# Patient Record
(demographics unavailable — no encounter records)

---

## 2024-11-03 NOTE — HISTORY OF PRESENT ILLNESS
[FreeTextEntry1] : 50 y/o P1 presenting for pre-op visit in preparation for TLH given AUB despite endometrial ablation and not yet in menopause. See 9/30 note for details.

## 2024-11-03 NOTE — DISCUSSION/SUMMARY
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 Also small simple ovarian cysts on sono. - Reviewed TLH again as above including risks - Pt has cardiology clearance in context of cardiac stents - Plan to keep ovaries given FSH not yet in menopause range, or remove one at most if cyst present - Endometrium benign on hysteroscopy last November and endometrium thin today, Pap NIL, HPV neg 11/2022, no need to repeat these prior to surgery - Bowel preparation instructions and script given (mostly for decompression in event of poor Trendelenburg tolerance) - c/w plan for TLH, b/l salpingectomy, cystoscopy, possible USO on 11/11 - c/w norethindrone until surgery - Return post-op  Michael Perez MD.

## 2024-11-03 NOTE — REASON FOR VISIT
[TextEntry] : This visit was provided via telehealth using real-time 2-way audio visual technology. The patient, AARON KRUEGER, was located at home at the time of the visit, at the following address: [address] [place], [place] [address] [address] The provider, Dr. Michael Perez, was located at the medical office located at [address] [place] [address] [address] [address] at the time of the visit. The patient, AARON KRUEGER and Provider participated in the telehealth encounter. Verbal consent given on 11/01/2024 by the patient, self.

## 2024-11-04 NOTE — ASSESSMENT
[FreeTextEntry1] : 51 year old female with history of CAD, HTN, HLD here to establish. Patient is getting hysterectomy on 11/11  HCM -reviewed previous labs -lipids measured -Urinalysis, hx of recent uti -No flu shot due to upcoming surgery -Recent ekg reviewed shows flattening of T wave.   Adenomysosis planned hysterectomy on 11/11 -patient cleared for surgery pending cardiology clearance -labs reviewed -patient had recent echo, results pending.   Anxiety advised by cardiology to start prozac 20mg, will continue for time being

## 2024-11-04 NOTE — HEALTH RISK ASSESSMENT
[Monthly or less (1 pt)] : Monthly or less (1 point) [1 or 2 (0 pts)] : 1 or 2 (0 points) [Never (0 pts)] : Never (0 points) [No falls in past year] : Patient reported no falls in the past year [0] : 2) Feeling down, depressed, or hopeless: Not at all (0) [Former] : Former [20 or more] : 20 or more [< 15 Years] : < 15 Years [Patient reported colonoscopy was normal] : Patient reported colonoscopy was normal [None] : None [With Significant Other] : lives with significant other [] :  [# Of Children ___] : has [unfilled] children [Feels Safe at Home] : Feels safe at home [Fully functional (bathing, dressing, toileting, transferring, walking, feeding)] : Fully functional (bathing, dressing, toileting, transferring, walking, feeding) [Fully functional (using the telephone, shopping, preparing meals, housekeeping, doing laundry, using] : Fully functional and needs no help or supervision to perform IADLs (using the telephone, shopping, preparing meals, housekeeping, doing laundry, using transportation, managing medications and managing finances) [Audit-CScore] : 1 [[id number]] : 0 [de-identified] : quit in summer of 2015 [Sexually Active] : not sexually active [High Risk Behavior] : no high risk behavior [Reports changes in hearing] : Reports no changes in hearing [Reports changes in vision] : Reports no changes in vision [Reports normal functional visual acuity (ie: able to read med bottle)] : Reports poor functional visual acuity.  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

## 2024-11-04 NOTE — HISTORY OF PRESENT ILLNESS
[FreeTextEntry1] : Here for CPE. New patient Clearance for Hysterectomy 11/11 [de-identified] : 51 year old female with history of CAD s/p stent in 2016, HTN, HLD, adenomyosis presents for physical. Patient recent labs for pre-op clearance reviewed.

## 2024-11-04 NOTE — HEALTH RISK ASSESSMENT
[Monthly or less (1 pt)] : Monthly or less (1 point) [1 or 2 (0 pts)] : 1 or 2 (0 points) [Never (0 pts)] : Never (0 points) [No falls in past year] : Patient reported no falls in the past year [0] : 2) Feeling down, depressed, or hopeless: Not at all (0) [Former] : Former [20 or more] : 20 or more [< 15 Years] : < 15 Years [Patient reported colonoscopy was normal] : Patient reported colonoscopy was normal [None] : None [With Significant Other] : lives with significant other [] :  [# Of Children ___] : has [unfilled] children [Feels Safe at Home] : Feels safe at home [Fully functional (bathing, dressing, toileting, transferring, walking, feeding)] : Fully functional (bathing, dressing, toileting, transferring, walking, feeding) [Fully functional (using the telephone, shopping, preparing meals, housekeeping, doing laundry, using] : Fully functional and needs no help or supervision to perform IADLs (using the telephone, shopping, preparing meals, housekeeping, doing laundry, using transportation, managing medications and managing finances) [Audit-CScore] : 1 [EJP1Moggc] : 0 [de-identified] : quit in summer of 2015 [Sexually Active] : not sexually active [High Risk Behavior] : no high risk behavior [Reports changes in hearing] : Reports no changes in hearing [Reports changes in vision] : Reports no changes in vision [Reports normal functional visual acuity (ie: able to read med bottle)] : Reports poor functional visual acuity.  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

## 2024-11-04 NOTE — HEALTH RISK ASSESSMENT
[Good] : ~his/her~ current health as good [Fair] :  ~his/her~ mood as fair [Yes] : Yes [2 - 3 times a week (3 pts)] : 2 - 3  times a week (3 points) [1 or 2 (0 pts)] : 1 or 2 (0 points) [Never (0 pts)] : Never (0 points) [No falls in past year] : Patient reported no falls in the past year [0] : 2) Feeling down, depressed, or hopeless: Not at all (0) [Former] : Former [20 or more] : 20 or more [Behavior] : difficulty with behavior [] :  [# Of Children ___] : has [unfilled] children [Feels Safe at Home] : Feels safe at home [Smoke Detector] : smoke detector [Carbon Monoxide Detector] : carbon monoxide detector

## 2024-11-04 NOTE — HISTORY OF PRESENT ILLNESS
[FreeTextEntry1] : Here for CPE. New patient Clearance for Hysterectomy 11/11 [de-identified] : 51 year old female with history of CAD s/p stent in 2016, HTN, HLD, adenomyosis presents for physical. Patient recent labs for pre-op clearance reviewed.

## 2024-11-04 NOTE — ADDENDUM
[FreeTextEntry1] : Adenomysosis planned hysterectomy on 11/11 -patient cleared for surgery pending cardiology clearance -labs reviewed -patient had recent echo, results pending.

## 2024-11-25 NOTE — PLAN
[FreeTextEntry1] : s/p TLH, b/l salpingectomy, cysto on 11/11 recovering well. - Discussed symptoms likely normal for recovery, can continue to monitor as they should continue to improve - Reviewed benign pathology - Reviewed no heavy lifting or submersion into water for 6 weeks post-op, no sex for 12 weeks post-op. Can return to all other normal activity - Return in 5-6w for vaginal cuff check  Michael Perez MD

## 2024-11-25 NOTE — HISTORY OF PRESENT ILLNESS
[Pain is well-controlled] : pain is well-controlled [Fever] : no fever [Chills] : no chills [Nausea] : no nausea [Vomiting] : no vomiting [Clean/Dry/Intact] : clean, dry and intact [Erythema] : not erythematous [Pathology reviewed] : pathology reviewed [de-identified] : 11/11/2024 [de-identified] : TLCHIN, EDITH, DELFINOO [de-identified] : s/p TLH, b/l salpingectomy, cysto on 11/11 presenting for post-op visit. Reports some bloating still after eating, but no nausea nor vomiting. Some fatigue still as well, otherwise pain well controlled, no other concerns.

## 2025-01-23 NOTE — PLAN
[FreeTextEntry1] : s/p TLH, b/l salpingectomy, cysto on 11/11 recovering well. Vaginal cuff intact on visualization and palpation - Can return to all normal activity currently, and can return to intercourse next week - Return for annual visit or as needed  Michael Perez MD

## 2025-01-23 NOTE — HISTORY OF PRESENT ILLNESS
[Pain is well-controlled] : pain is well-controlled [Fever] : no fever [Chills] : no chills [Nausea] : no nausea [Vomiting] : no vomiting [Clean/Dry/Intact] : clean, dry and intact [Erythema] : not erythematous [None] : no vaginal bleeding [Normal] : normal [de-identified] : 11/11/2024 [de-identified] : TLCHIN, EDITH, DELFINOO [de-identified] : s/p TLH, b/l salpingectomy, cysto on 11/11 presenting for post-op visit. No complaints today.

## 2025-02-12 NOTE — HEALTH RISK ASSESSMENT
[No] : No [No falls in past year] : Patient reported no falls in the past year [Little interest or pleasure doing things] : 1) Little interest or pleasure doing things [Feeling down, depressed, or hopeless] : 2) Feeling down, depressed, or hopeless [0] : 2) Feeling down, depressed, or hopeless: Not at all (0) [PHQ-2 Negative - No further assessment needed] : PHQ-2 Negative - No further assessment needed [[id number]] : 0

## 2025-02-12 NOTE — HISTORY OF PRESENT ILLNESS
[FreeTextEntry1] : follow up htn predm hld  [de-identified] : htn predm  hld has been well post surgery

## 2025-03-03 NOTE — PLAN
[TextEntry] : Continue AutoPAP.  Changed pressure to 9-15 cmH2O. Advised she try w/o ramp; showed her how to change it.  Weight loss. Cardiology f/u.  Let me know if any issues.

## 2025-03-03 NOTE — REVIEW OF SYSTEMS
[EDS: ESS=____] : daytime somnolence: ESS=[unfilled] [Snoring] : snoring [Obesity] : obesity [Heartburn] : heartburn [Hypersomnolence] : sleeping much more than usual [Negative] : Psychiatric [Difficulty Initiating Sleep] : no difficulty falling asleep [Lower Extremity Discomfort] : no lower extremity discomfort [Unusual Sleep Behavior] : no unusual sleep behavior

## 2025-03-03 NOTE — PHYSICAL EXAM
[General Appearance - In No Acute Distress] : no acute distress [Normal Conjunctiva] : the conjunctiva exhibited no abnormalities [Low Lying Soft Palate] : low lying soft palate [Elongated Uvula] : elongated uvula [III] : III [Heart Rate And Rhythm] : heart rate was normal and rhythm regular [Heart Sounds] : normal S1 and S2 [Respiration, Rhythm And Depth] : normal respiratory rhythm and effort [Exaggerated Use Of Accessory Muscles For Inspiration] : no accessory muscle use [Auscultation Breath Sounds / Voice Sounds] : lungs were clear to auscultation bilaterally [Abnormal Walk] : normal gait [Musculoskeletal - Swelling] : no joint swelling seen [Nail Clubbing] : no clubbing of the fingernails [Cyanosis, Localized] : no localized cyanosis [No Focal Deficits] : no focal deficits [Oriented To Time, Place, And Person] : oriented to person, place, and time [Impaired Insight] : insight and judgment were intact [Affect] : the affect was normal [Neck Appearance] : the appearance of the neck was normal [] : the neck was supple [Normal Oropharynx] : abnormal oropharynx

## 2025-03-03 NOTE — HISTORY OF PRESENT ILLNESS
[Obstructive Sleep Apnea] : obstructive sleep apnea [Date: ___] : Date of most recent diagnostic polysomnogram: [unfilled] [AHI: ___ per hour] : Apnea-hypopnea index:  [unfilled] per hour [CPAP: ___ cmH2O] : CPAP: [unfilled] cmH2O [% Days used > 4 hrs: ____] : Days used > 4 hrs: [unfilled] % [Therapy based AHI: ___ /hr] : Therapy based AHI: [unfilled] / hr [FreeTextEntry1] : Hx moderate XU.   Doing well on CPAP as APAP 5-20. Got a new machine 11/23/23. On Dreamwear UTN mask.  Compliance is excellent. Therapeutic AHI WNL.  Feels better since using it. No EDS. More energy during the day. Only issue is that when she first puts it on, does not feel like enough pressure.     [Snoring] : no snoring [Witnessed Apneas] : no witnessed sleep apnea [Unintentional Sleep While Inactive] : no unintentional sleep while inactive [Awakes Unrefreshed] : does not awake unrefreshed [Awakes with Headache] : no headache upon awakening [Awakening With Dry Mouth] : no dry mouth upon awakening [Recent  Weight Gain] : no recent weight gain [Daytime Somnolence] : no daytime somnolence

## 2025-03-03 NOTE — CONSULT LETTER
[Dear  ___] : Dear  [unfilled], [Courtesy Letter:] : I had the pleasure of seeing your patient, [unfilled], in my office today. [Consult Closing:] : Thank you very much for allowing me to participate in the care of this patient.  If you have any questions, please do not hesitate to contact me. [DrSoumya  ___] : Dr. AGRCIA

## 2025-03-25 NOTE — HISTORY OF PRESENT ILLNESS
[FreeTextEntry1] : 9/14/21: 48 y/o woman with severe right and moderate L carotid stenosis. She denies dizziness, lightheadedness, SOB, Amaurosis Fugax, memory or neurological deficits, but continues to experience headaches, unchanged. She exercises regularly, maintains a balanced diet and adequate hydration. Currently on ASA 81 mg, Atorvastatin 40 mg.   3/16/22: Pt is doing well since last visit. She denies dizziness, lightheadedness, SOB, Amaurosis Fugax, memory or neurological deficits, but continues to experience headaches, unchanged. She has been complaint with ASA 81 mg and atorvastatin 40 mg. She has been walking without any claudication.   3/15/23: Pt is doing well since last visit. She denies dizziness, lightheadedness, SOB, Amaurosis Fugax, memory or neurological deficits, but continues to experience headaches, unchanged. She has been complaint with ASA 81 mg and atorvastatin 40 mg. She has been walking without any claudication.   3/20/24: Pt is doing well since last visit. She denies dizziness, lightheadedness, SOB, Amaurosis Fugax, memory or neurological deficits, but continues to experience headaches, unchanged. She has been complaint with ASA 81 mg and atorvastatin 40 mg. She has been walking without any claudication.   3/19/25: Pt is doing well since last visit. She denies dizziness, lightheadedness, SOB, Amaurosis Fugax, memory or neurological deficits, but continues to experience headaches, unchanged. She has been complaint with ASA 81 mg and atorvastatin 40 mg. She has been walking without any claudication.

## 2025-03-25 NOTE — PHYSICAL EXAM
[Alert] : alert [Oriented to Person] : oriented to person [Oriented to Place] : oriented to place [Oriented to Time] : oriented to time [Calm] : calm [Right Carotid Bruit] : no bruit heard over the right carotid [Left Carotid Bruit] : no bruit heard over the left carotid [Ankle Swelling (On Exam)] : not present [] : not present [Varicose Veins Of Lower Extremities] : not present [de-identified] : WD, WN, NAD. Awake, alert, interactive. Ambulates without difficulty [de-identified] : no cyanosis or deformity. full ROM, MS 5/5\par   [de-identified] : SRIRAM.

## 2025-03-25 NOTE — ASSESSMENT
[FreeTextEntry1] : 50 y/o female with asymptomatic carotid stenosis possibly due to FMD. U/S demonstrates no significant B/L ICA stenosis, unchanged. We will continue to monitor closely by serial ultrasound as she remains asymptomatic.  Continue ASA and atorvastatin TX. Continue medical management for HTN. Maintain a balanced diet and adequate hydration.  Exercise on a daily basis, which improves HDL and reduces LDL. U/S Carotid Duplex completed in office today. I have evaluated results and discussed them fully with the patient. All questions and concerns have been discussed to patient satisfaction. RTC in 1 year for repeat carotid artery duplex  A total of 20 minutes was spent with patient and coordinating care

## 2025-03-25 NOTE — ASSESSMENT
[FreeTextEntry1] : 52 y/o female with asymptomatic carotid stenosis possibly due to FMD. U/S demonstrates no significant B/L ICA stenosis, unchanged. We will continue to monitor closely by serial ultrasound as she remains asymptomatic.  Continue ASA and atorvastatin TX. Continue medical management for HTN. Maintain a balanced diet and adequate hydration.  Exercise on a daily basis, which improves HDL and reduces LDL. U/S Carotid Duplex completed in office today. I have evaluated results and discussed them fully with the patient. All questions and concerns have been discussed to patient satisfaction. RTC in 1 year for repeat carotid artery duplex  A total of 20 minutes was spent with patient and coordinating care

## 2025-03-25 NOTE — PHYSICAL EXAM
[Alert] : alert [Oriented to Person] : oriented to person [Oriented to Place] : oriented to place [Oriented to Time] : oriented to time [Calm] : calm [Right Carotid Bruit] : no bruit heard over the right carotid [Left Carotid Bruit] : no bruit heard over the left carotid [Ankle Swelling (On Exam)] : not present [Varicose Veins Of Lower Extremities] : not present [] : not present [de-identified] : WD, WN, NAD. Awake, alert, interactive. Ambulates without difficulty [de-identified] : no cyanosis or deformity. full ROM, MS 5/5\par   [de-identified] : SRIRAM.

## 2025-03-25 NOTE — PROCEDURE
[FreeTextEntry1] : Studies:  3/16/22 BL carotid artery duplex:  right: 50-69% ICA stenosis. Vertebral artery antegrade.  left: 50-69% ICA stenosis. ICA tortuous.  Vertebral artery antegrade.   3/15/23 BL carotid artery duplex:  right: 50-69% ICA stenosis. Vertebral artery antegrade.  left: 50-69% ICA stenosis. ICA tortuous.  Vertebral artery antegrade.   3/20/24 BL carotid artery duplex:  right: <50% ICA stenosis. Vertebral artery antegrade.  left: <50% ICA stenosis. ICA tortuous.  Vertebral artery antegrade.   3/19/25 BL carotid artery duplex:  right: <50% ICA stenosis. Vertebral artery antegrade.  left: <50% ICA stenosis. ICA tortuous.  Vertebral artery antegrade.

## 2025-03-25 NOTE — HISTORY OF PRESENT ILLNESS
[FreeTextEntry1] : 9/14/21: 46 y/o woman with severe right and moderate L carotid stenosis. She denies dizziness, lightheadedness, SOB, Amaurosis Fugax, memory or neurological deficits, but continues to experience headaches, unchanged. She exercises regularly, maintains a balanced diet and adequate hydration. Currently on ASA 81 mg, Atorvastatin 40 mg.   3/16/22: Pt is doing well since last visit. She denies dizziness, lightheadedness, SOB, Amaurosis Fugax, memory or neurological deficits, but continues to experience headaches, unchanged. She has been complaint with ASA 81 mg and atorvastatin 40 mg. She has been walking without any claudication.   3/15/23: Pt is doing well since last visit. She denies dizziness, lightheadedness, SOB, Amaurosis Fugax, memory or neurological deficits, but continues to experience headaches, unchanged. She has been complaint with ASA 81 mg and atorvastatin 40 mg. She has been walking without any claudication.   3/20/24: Pt is doing well since last visit. She denies dizziness, lightheadedness, SOB, Amaurosis Fugax, memory or neurological deficits, but continues to experience headaches, unchanged. She has been complaint with ASA 81 mg and atorvastatin 40 mg. She has been walking without any claudication.   3/19/25: Pt is doing well since last visit. She denies dizziness, lightheadedness, SOB, Amaurosis Fugax, memory or neurological deficits, but continues to experience headaches, unchanged. She has been complaint with ASA 81 mg and atorvastatin 40 mg. She has been walking without any claudication.

## 2025-06-26 NOTE — HISTORY OF PRESENT ILLNESS
[postmenopausal] : postmenopausal [Y] : Positive pregnancy history [Approximately ___ (Month)] : LMP was approximately [unfilled] month(s) ago [Menarche Age: ____] : age at menarche was [unfilled] [No] : Patient does not have concerns regarding sex [Mammogramdate] : 09/28/24 [TextBox_19] : BR-2 [BreastSonogramDate] : 09/28/24 [TextBox_25] : BR-2 [PapSmeardate] : 01/12/24 [TextBox_31] : NEG [ColonoscopyDate] : 06/26/23 [HPVDate] : 01/12/24 [TextBox_78] : NEG [LMPDate] : 11/2023 [PGxTotal] : 1 [[facility] Center[facility]] : 1 [[facility]iving] : 1 [FreeTextEntry1] :  X1

## 2025-06-26 NOTE — DISCUSSION/SUMMARY
[FreeTextEntry1] : 51 y/o s/p TLH, b/l salpingectomy presenting for annual visit, no complaints today. - Exam normal today - Pap no longer indicated given TLH and no hx of SALAS 2+ - Reviewed possible menopausal symptoms should they develop and treatment options if needed - Mammogram, breast sono, and breast MRI ordered today - Return for annual visit or as needed  Michael Perez MD

## 2025-06-26 NOTE — PHYSICAL EXAM
[MA] : MA [Chaperoned Physical Exam] : A chaperone was present in the examining room during all aspects of the physical examination. [FreeTextEntry2] : JENNIFER BORJAS [Appropriately responsive] : appropriately responsive [Alert] : alert [No Acute Distress] : no acute distress [Oriented x3] : oriented x3 [Examination Of The Breasts] : a normal appearance [No Masses] : no breast masses were palpable [Labia Majora] : normal [Labia Minora] : normal [Normal] : normal [Absent] : absent [Uterine Adnexae] : normal